# Patient Record
Sex: MALE | Race: WHITE | NOT HISPANIC OR LATINO | URBAN - METROPOLITAN AREA
[De-identification: names, ages, dates, MRNs, and addresses within clinical notes are randomized per-mention and may not be internally consistent; named-entity substitution may affect disease eponyms.]

---

## 2024-06-23 ENCOUNTER — HOSPITAL ENCOUNTER (EMERGENCY)
Facility: HOSPITAL | Age: 49
Discharge: HOME | End: 2024-06-23
Attending: EMERGENCY MEDICINE
Payer: MEDICARE

## 2024-06-23 VITALS
SYSTOLIC BLOOD PRESSURE: 179 MMHG | RESPIRATION RATE: 18 BRPM | DIASTOLIC BLOOD PRESSURE: 121 MMHG | WEIGHT: 192 LBS | TEMPERATURE: 99 F | OXYGEN SATURATION: 98 % | HEART RATE: 87 BPM

## 2024-06-23 DIAGNOSIS — K42.9 UMBILICAL HERNIA WITHOUT OBSTRUCTION AND WITHOUT GANGRENE: Primary | ICD-10-CM

## 2024-06-23 PROCEDURE — 99281 EMR DPT VST MAYX REQ PHY/QHP: CPT

## 2024-06-23 NOTE — DISCHARGE INSTRUCTIONS
Continue to use abdominal binder as we discussed until you follow-up with general surgery.  Please do not hesitate to return to ER if you develop any worsening or concerning abdominal pain  I have placed a general surgery referral to [facility] for you in [facility] system today

## 2024-06-23 NOTE — ED PROVIDER NOTES
Encounter Date: 6/23/2024       History     Chief Complaint   Patient presents with    Abdominal Pain     PT TO ER WITH C/O CENTRAL ABDOMINAL PAIN YESTERDAY AFTER LIFTING A CASE OF WATER. PT REPORTS HE HAS AN UMBILICAL HERNIA AND WANTS TO GET IT CHECKED OUT. PT DENIES PAIN AT THIS TIME.      This note is dictated on M*Modal word recognition program.  There are word recognition mistakes and grammatical errors that are occasionally missed on review.     Cristopher Padron is a 48 y.o. male presents to ER today with complaints of umbilical hernia.  Patient reports he has a history of umbilical hernia with a pair years ago.  He reports he was lifting on a heavy bottle of water yesterday when he noticed the hernia has become more pronounced.  Patient denies any pain at this time.  Only symptom is patient reports the hernia is now bigger than usually is after lifting on a heavy bottle of water      The history is provided by the patient.     Review of patient's allergies indicates:  No Known Allergies  History reviewed. No pertinent past medical history.  No past surgical history on file.  No family history on file.     Review of Systems   Gastrointestinal:         Patient reports abdominal hernia.   All other systems reviewed and are negative.      Physical Exam     Initial Vitals [06/23/24 1236]   BP Pulse Resp Temp SpO2   (!) 179/121 87 18 99.2 °F (37.3 °C) 98 %      MAP       --         Physical Exam    Constitutional: He appears well-developed and well-nourished. He is not diaphoretic. No distress.   HENT:   Head: Normocephalic.   Eyes: Pupils are equal, round, and reactive to light.   Neck: Neck supple.   Normal range of motion.  Cardiovascular:  Normal rate and regular rhythm.           Abdominal: He exhibits mass.   Patient has abdominal hernia.  To umbilicus.  There is mild tenderness to palpation.  The hernia was reducible in ER.     Musculoskeletal:         General: No edema. Normal range of motion.       Cervical back: Normal range of motion and neck supple.     Neurological: He is alert and oriented to person, place, and time. GCS score is 15. GCS eye subscore is 4. GCS verbal subscore is 5. GCS motor subscore is 6.   Skin: No erythema.   Psychiatric: He has a normal mood and affect. His behavior is normal. Thought content normal.         ED Course   Procedures  Labs Reviewed - No data to display       Imaging Results    None          Medications - No data to display  Medical Decision Making  Differential diagnosis includes incarcerated hernia, strangulated hernia, abdominal hernia, umbilicus hernia, muscle strain     Patient's symptoms most consistent with abdominal hernia just superior to umbilicus.  Hernia was reducible in ER today.  Dr. Goodwin also evaluated hernia with me.  Patient was placed in abdominal binder at time of discharge.    Patient instructed to follow-up with general surgery.    General surgery consultation placed within [facility] system for patient.    Patient denies any abdominal pain at time of discharge.  Vital signs stable.  Patient stable at time of discharge in no acute distress.  No life-threatening illnesses were found during ER visit today.  Patient was instructed to follow-up with PCP or other recommended specialist within the next 48-72 hours.  Patient was instructed to return to ER immediately for any worsening or concerning symptoms.  All discharge instructions discussed with patient, and patient agrees to comply with discharge instructions given today.                                       Clinical Impression:  Final diagnoses:  [K42.9] Umbilical hernia without obstruction and without gangrene (Primary)          ED Disposition Condition    Discharge Stable          ED Prescriptions    None       Follow-up Information    None          Haroon Flro NP  06/23/24 [id number]